# Patient Record
Sex: FEMALE | Race: WHITE
[De-identification: names, ages, dates, MRNs, and addresses within clinical notes are randomized per-mention and may not be internally consistent; named-entity substitution may affect disease eponyms.]

---

## 2018-01-22 ENCOUNTER — HOSPITAL ENCOUNTER (OUTPATIENT)
Dept: HOSPITAL 91 - GIL | Age: 38
Discharge: HOME | End: 2018-01-22
Payer: COMMERCIAL

## 2018-01-22 ENCOUNTER — HOSPITAL ENCOUNTER (OUTPATIENT)
Age: 38
Discharge: HOME | End: 2018-01-22

## 2018-01-22 DIAGNOSIS — K62.1: Primary | ICD-10-CM

## 2018-01-22 DIAGNOSIS — K64.4: ICD-10-CM

## 2018-01-22 PROCEDURE — 43239 EGD BIOPSY SINGLE/MULTIPLE: CPT

## 2018-01-22 PROCEDURE — 88305 TISSUE EXAM BY PATHOLOGIST: CPT

## 2019-06-21 ENCOUNTER — HOSPITAL ENCOUNTER (OUTPATIENT)
Dept: HOSPITAL 10 - SDS | Age: 39
Discharge: HOME | End: 2019-06-21
Attending: SURGERY
Payer: COMMERCIAL

## 2019-06-21 ENCOUNTER — HOSPITAL ENCOUNTER (OUTPATIENT)
Dept: HOSPITAL 91 - SDS | Age: 39
Discharge: HOME | End: 2019-06-21
Payer: COMMERCIAL

## 2019-06-21 VITALS
HEIGHT: 62 IN | WEIGHT: 147.49 LBS | BODY MASS INDEX: 27.14 KG/M2 | BODY MASS INDEX: 27.14 KG/M2 | WEIGHT: 147.49 LBS | HEIGHT: 62 IN

## 2019-06-21 VITALS — SYSTOLIC BLOOD PRESSURE: 110 MMHG | DIASTOLIC BLOOD PRESSURE: 70 MMHG | HEART RATE: 91 BPM | RESPIRATION RATE: 20 BRPM

## 2019-06-21 VITALS — DIASTOLIC BLOOD PRESSURE: 61 MMHG | SYSTOLIC BLOOD PRESSURE: 105 MMHG | HEART RATE: 57 BPM | RESPIRATION RATE: 16 BRPM

## 2019-06-21 VITALS — DIASTOLIC BLOOD PRESSURE: 96 MMHG | SYSTOLIC BLOOD PRESSURE: 141 MMHG | HEART RATE: 96 BPM | RESPIRATION RATE: 18 BRPM

## 2019-06-21 VITALS — RESPIRATION RATE: 16 BRPM | SYSTOLIC BLOOD PRESSURE: 142 MMHG | HEART RATE: 90 BPM | DIASTOLIC BLOOD PRESSURE: 90 MMHG

## 2019-06-21 VITALS — HEART RATE: 92 BPM | SYSTOLIC BLOOD PRESSURE: 137 MMHG | DIASTOLIC BLOOD PRESSURE: 94 MMHG | RESPIRATION RATE: 19 BRPM

## 2019-06-21 DIAGNOSIS — D17.1: Primary | ICD-10-CM

## 2019-06-21 LAB
ADD MAN DIFF?: NO
ALANINE AMINOTRANSFERASE: 28 IU/L (ref 13–69)
ALBUMIN/GLOBULIN RATIO: 1.18
ALBUMIN: 3.8 G/DL (ref 3.3–4.9)
ALKALINE PHOSPHATASE: 49 IU/L (ref 42–121)
ANION GAP: 6 (ref 5–13)
ASPARTATE AMINO TRANSFERASE: 25 IU/L (ref 15–46)
BASOPHIL #: 0.1 10^3/UL (ref 0–0.1)
BASOPHILS %: 0.8 % (ref 0–2)
BILIRUBIN,DIRECT: 0 MG/DL (ref 0–0.2)
BILIRUBIN,TOTAL: 0.4 MG/DL (ref 0.2–1.3)
BLOOD UREA NITROGEN: 11 MG/DL (ref 7–20)
CALCIUM: 8.7 MG/DL (ref 8.4–10.2)
CARBON DIOXIDE: 26 MMOL/L (ref 21–31)
CHLORIDE: 109 MMOL/L (ref 97–110)
CREATININE: 0.6 MG/DL (ref 0.44–1)
EOSINOPHILS #: 0.1 10^3/UL (ref 0–0.5)
EOSINOPHILS %: 0.8 % (ref 0–7)
GLOBULIN: 3.2 G/DL (ref 1.3–3.2)
GLUCOSE: 86 MG/DL (ref 70–220)
HEMATOCRIT: 40.8 % (ref 37–47)
HEMOGLOBIN: 13.6 G/DL (ref 12–16)
IMMATURE GRANS #M: 0.03 10^3/UL (ref 0–0.03)
IMMATURE GRANS % (M): 0.5 % (ref 0–0.43)
INR: 0.87
LYMPHOCYTES #: 2.1 10^3/UL (ref 0.8–2.9)
LYMPHOCYTES %: 34.4 % (ref 15–51)
MEAN CORPUSCULAR HEMOGLOBIN: 30.4 PG (ref 29–33)
MEAN CORPUSCULAR HGB CONC: 33.3 G/DL (ref 32–37)
MEAN CORPUSCULAR VOLUME: 91.1 FL (ref 82–101)
MEAN PLATELET VOLUME: 11.5 FL (ref 7.4–10.4)
MONOCYTE #: 0.5 10^3/UL (ref 0.3–0.9)
MONOCYTES %: 8.9 % (ref 0–11)
NEUTROPHIL #: 3.3 10^3/UL (ref 1.6–7.5)
NEUTROPHILS %: 54.6 % (ref 39–77)
NUCLEATED RED BLOOD CELLS #: 0 10^3/UL (ref 0–0)
NUCLEATED RED BLOOD CELLS%: 0 /100WBC (ref 0–0)
PARTIAL THROMBOPLASTIN TIME: 29.7 SEC (ref 23–35)
PLATELET COUNT: 244 10^3/UL (ref 140–415)
POTASSIUM: 4 MMOL/L (ref 3.5–5.1)
PROTIME: 11.9 SEC (ref 11.9–14.9)
PT RATIO: 0.9
RED BLOOD COUNT: 4.48 10^6/UL (ref 4.2–5.4)
RED CELL DISTRIBUTION WIDTH: 12.3 % (ref 11.5–14.5)
SODIUM: 141 MMOL/L (ref 135–144)
TOTAL PROTEIN: 7 G/DL (ref 6.1–8.1)
WHITE BLOOD COUNT: 6 10^3/UL (ref 4.8–10.8)

## 2019-06-21 PROCEDURE — 85025 COMPLETE CBC W/AUTO DIFF WBC: CPT

## 2019-06-21 PROCEDURE — 85610 PROTHROMBIN TIME: CPT

## 2019-06-21 PROCEDURE — 88307 TISSUE EXAM BY PATHOLOGIST: CPT

## 2019-06-21 PROCEDURE — 85730 THROMBOPLASTIN TIME PARTIAL: CPT

## 2019-06-21 PROCEDURE — 14001 TIS TRNFR TRUNK 10.1-30SQCM: CPT

## 2019-06-21 PROCEDURE — 80053 COMPREHEN METABOLIC PANEL: CPT

## 2019-06-21 RX ADMIN — BUPIVACAINE HYDROCHLORIDE 1 ML: 2.5 INJECTION, SOLUTION EPIDURAL; INFILTRATION; INTRACAUDAL; PERINEURAL at 18:09

## 2019-06-21 NOTE — OPR
Date/Time of Note


Date/Time of Note


DATE: 6/21/19 


TIME: 18:20





Operative Report


Procedure Date:  Jun 21, 2019


Preoperative Diagnosis


left groin mass


Postoperative Diagnosis


same


Operation/Procedure Performed


1. excision of left groin mass 6 x 2 cm mass 


2. localized adjacent tissue transfer with the use of skin flaps 12 sq cm defect


of left groin


3. therapeutic injection of subcutaneous local anesthesia


Surgeon


see signature line


Assistant


none


Anesthesia Type:  general


Estimated Blood Loss:  0 - 10 ml's


Transfusion


   none


Specimen


left groin mass


Grafts/Implants


none


Complications


none


Pt Condition Post Procedure:  stable


Indications


This is a 38-year-old female with a left groin mass.  She requests surgical 


excision.  Risks alternatives benefits and personal were discussed the patient. 


Patient expressed understanding and consents to the operation.


Procedure Description


Patient is taken to the OR and prepped and draped in usual sterile fashion.  


Surgical time was performed.  IV antibiotics given.  Elliptical incision was 


made over the left groin mass.  Dissection with cautery skin onto the mass and 


the mass was circumferentially excised.  Good hemostasis status.  Due to tissue 


defect localization just transfer with use of skin flaps was performed.  


Ultimately closed with interrupted 3-0 Vicryl and skin staples.  Therapeutic 


contains local anesthesia injected at the incision site.  Dry dressings were 


applied.











ELIE HARRY                  Jun 21, 2019 18:22

## 2019-06-21 NOTE — PAC
Date/Time of Note


Date/Time of Note


DATE: 6/21/19 


TIME: 18:36





Post-Anesthesia Notes


Post-Anesthesia Note


Last documented vital signs





Vital Signs


  Date      Temp  Pulse  Resp  B/P (MAP)   Pulse Ox  O2          O2 Flow    FiO2


Time                                                 Delivery    Rate


   6/21/19  97.8     57    16      105/61        96  Room Air


     14:56                           (76)





Activity:  WNL


Respiratory function:  WNL


Cardiovascular function:  WNL


Mental status:  Baseline


Pain reasonably controlled:  Yes


Hydration appropriate:  Yes


Nausea/Vomiting absent:  Yes


Comments


BP:122/67, P:68, Spo2:100%, T:98,8











CRUZITO YANG MD              Jun 21, 2019 18:36

## 2019-06-21 NOTE — PREAC
Date/Time of Note


Date/Time of Note


DATE: 6/21/19 


TIME: 17:39





Anesthesia Eval and Record


Evaluation


Time Pre-Procedure Interview


DATE: 6/21/19 


TIME: 17:39


Age


38


Sex


female


NPO:  8 hrs


Preoperative diagnosis


Lt groin mass


Planned procedure


Excision of lt groin mass





Past Medical History


Past Medical History:  None





Surgery & Anesthesia Issues


No known issue





Meds


Anticoagulation:  No


Beta Blocker within 24 hr:  No


Reason Beta Blocker not given:  Pt. not on B-Blocker


Reported Medications


Chlorpromazine Hcl* (Chlorpromazine Hcl*) 100 Mg Tablet, 1 TAB ORAL DAILY


   6/21/19


Dicyclomine HCl (Dicyclomine HCl) 10 Mg Capsule, 10 MG PO DAILY PRN for PAIN 


LEVEL 1-3, CAP


   1/22/18


Discontinued Reported Medications


Gabapentin* (Gabapentin*) 100 Mg Capsule, 100 MG PO TID, #90 CAP


   1/22/18





Current Medications


Sodium Chloride 1,000 ml @  75 mls/hr [id number] IV ;  Start 6/21/19 at 06:00;  


Stop 6/21/19 at 19:19


Cefazolin Sodium 50 ml @  100 mls/hr PREOP IVPB ;  Start 6/21/19 at 06:00;  Stop


6/21/19 at 19:00


Meds reviewed:  Yes





Allergies


Coded Allergies:  


     morphine (Verified  Allergy, Unknown, 6/21/19)


Allergies Reviewed:  Yes





Labs/Studies


Labs Reviewed:  Reviewed by anesthesiologist


Result Diagram:  


6/21/19 1431                                                                    


           6/21/19 1431





Laboratory Tests


6/21/19 14:31








Pregnancy test:  Negative


Studies:  ECG





Pre-procedure Exam


Last vitals





Vital Signs


  Date      Temp  Pulse  Resp  B/P (MAP)   Pulse Ox  O2          O2 Flow    FiO2


Time                                                 Delivery    Rate


   6/21/19  97.8     57    16      105/61        96  Room Air


     14:56                           (76)





Airway:  Adequate mouth opening, Adequate thyromental dist


Mallampati:  Mallampati II


Teeth:  Normal


Lung:  Normal


Heart:  Normal





ASA Physical Status


ASA physical status:  2


Emergency:  None





Planned Anesthetic


General/MAC:  LMA





Planned Pain Management


Parenteral pain med





Pre-operative Attestations


Prior to commencing anesthesia and surgery, the patient was re-evaluated, there 


was verification of:


*The patient's identity


*The results of appropriate recent lab work and preoperative vital signs


*The above evaluation not changing prior to induction


*Anesthetic plan, risk benefits, alternative and complications discussed with 


patient/family; questions answered; patient/family understands, accepts and 


wishes to proceed.











CRUZITO YANG MD              Jun 21, 2019 17:42